# Patient Record
Sex: FEMALE | Race: WHITE
[De-identification: names, ages, dates, MRNs, and addresses within clinical notes are randomized per-mention and may not be internally consistent; named-entity substitution may affect disease eponyms.]

---

## 2017-02-10 ENCOUNTER — HOSPITAL ENCOUNTER (OUTPATIENT)
Dept: HOSPITAL 58 - RAD | Age: 77
Discharge: HOME | End: 2017-02-10
Attending: INTERNAL MEDICINE

## 2017-02-10 DIAGNOSIS — R91.8: Primary | ICD-10-CM

## 2017-02-10 NOTE — CT
EXAM:  CT THORAX 

  

HISTORY:  Lung nodule. 

  

TECHNIQUE:  CT thorax within intravenous contrast.  5-mm axial sections. Coronal and sagittal re-for
mations. 

COMPARISON:  08/01/2016 

  

FINDINGS: 

  

The heart size is within normal limits.  Trace pericardial effusion.  A few nonspecific scattered me
diastinal and hilar lymph nodes, some which are calcified. 

  

Lungs are mildly hyperinflated.  There are a few scattered pulmonary nodules bilaterally all of whic
h appear stable and some are calcified consistent with benign granulomas.  The largest nodule locate
d in the right lower lobe at 0.5 cm has no definite calcification.  Bilateral discoid consolidations
 in the anterior cardiophrenic angles are grossly stable possibly related to chronic atelectasis and
 scarring.  No pleural fluid or vascular congestion. 

  

No acute bony abnormality.  There is suggestion of mild pectus deformity leading to mild mass effect
 on the anterior heart border. 

  

  

IMPRESSION: 

1.  There are a few scattered bilateral nodules some which have definite benign character and at flip
st one on the right is indeterminate although all of these levels are stable.  Consider follow-up CT
 in 6 months. 

2.  Discoid consolidations anterior cardiophrenic angles are stable possibly related to scarring and
 / or atelectasis. 

3.  Lungs are mildly hyperinflated.

## 2018-02-13 ENCOUNTER — HOSPITAL ENCOUNTER (OUTPATIENT)
Dept: HOSPITAL 58 - RAD | Age: 78
Discharge: HOME | End: 2018-02-13
Attending: NURSE PRACTITIONER

## 2018-02-13 DIAGNOSIS — R91.8: ICD-10-CM

## 2018-02-13 DIAGNOSIS — Z12.31: Primary | ICD-10-CM

## 2018-02-13 PROCEDURE — 77067 SCR MAMMO BI INCL CAD: CPT

## 2018-02-13 NOTE — CT
Exam:  CT of the chest without intravenous contrast. 

  

Comparison:  02/10/2017. 

  

Reason for exam:  Follow-up nodule. 

  

FINDINGS:  Image interpretation is limited by the lack of intravenous contrast administration. 

  

No pneumothorax.  Patchy airspace opacities are seen in both anterior lung bases likely atelectasis. 
 The aorta is normal in course and caliber.  The heart is not enlarged.  There is a tiny pericardial 
effusion. 

  

Similar appearing sub-centimeter nodules (measuring approximately 5 mm) are seen in the right middle 
and right lower lobes.  Findings do not appear significantly changed when compared to the previous ex
am.  No new parenchymal nodularity is seen. 

  

The gallbladder has been removed. 

  

No acute fracture or malalignment is seen in the thoracic spine. 

  

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 

  

Impression: 

1. Sub centimeter nodules in the lung parenchyma that do not appear significantly changed when compar
ed to the examination performed on 02/10/2017.  Recommend follow up imaging and 12 months to document
 stability. 

2.  Discoid consolidations in the anterior lung bases.  Imaging findings are most likely scarring or 
atelectasis.

## 2019-02-21 ENCOUNTER — HOSPITAL ENCOUNTER (OUTPATIENT)
Dept: HOSPITAL 58 - RAD | Age: 79
Discharge: HOME | End: 2019-02-21
Attending: FAMILY MEDICINE

## 2019-02-21 DIAGNOSIS — Z12.31: Primary | ICD-10-CM

## 2019-02-22 NOTE — MAMMO
EXAM:  Bilateral digital screening mammogram (2-D and 3-D) 

  

History:  Screening 

  

Comparison:  Bilateral mammogram 02/13/2018 

  

Findings:  MLO and CC views of bilateral breasts demonstrate scattered fibroglandular breast parenchy
ma.  CAD was reviewed by the radiologist.  Tomosynthesis was performed.  Stable benign bilateral julia
st calcifications.  There are no dominant masses, no suspicious microcalcifications and no architectu
ral distortions 

  

Impression:  Benign stable mammogram.  Recommend followup routine screening mammography in 1 year. 

  

BIRADS 2, benign